# Patient Record
Sex: FEMALE | Race: WHITE | NOT HISPANIC OR LATINO | URBAN - METROPOLITAN AREA
[De-identification: names, ages, dates, MRNs, and addresses within clinical notes are randomized per-mention and may not be internally consistent; named-entity substitution may affect disease eponyms.]

---

## 2022-10-17 ENCOUNTER — OFFICE VISIT (OUTPATIENT)
Dept: ENDOCRINOLOGY | Facility: CLINIC | Age: 52
End: 2022-10-17

## 2022-10-17 ENCOUNTER — TELEPHONE (OUTPATIENT)
Dept: ENDOCRINOLOGY | Facility: CLINIC | Age: 52
End: 2022-10-17

## 2022-10-17 ENCOUNTER — LAB (OUTPATIENT)
Dept: LAB | Facility: HOSPITAL | Age: 52
End: 2022-10-17

## 2022-10-17 VITALS
WEIGHT: 144 LBS | BODY MASS INDEX: 24.59 KG/M2 | SYSTOLIC BLOOD PRESSURE: 100 MMHG | HEIGHT: 64 IN | DIASTOLIC BLOOD PRESSURE: 66 MMHG | OXYGEN SATURATION: 98 % | HEART RATE: 71 BPM

## 2022-10-17 DIAGNOSIS — E03.8 HYPOTHYROIDISM DUE TO HASHIMOTO'S THYROIDITIS: Primary | ICD-10-CM

## 2022-10-17 DIAGNOSIS — E06.3 HYPOTHYROIDISM DUE TO HASHIMOTO'S THYROIDITIS: Primary | ICD-10-CM

## 2022-10-17 DIAGNOSIS — E28.2 PCOS (POLYCYSTIC OVARIAN SYNDROME): ICD-10-CM

## 2022-10-17 DIAGNOSIS — R53.1 WEAKNESS: ICD-10-CM

## 2022-10-17 LAB
ALBUMIN SERPL-MCNC: 4.2 G/DL (ref 3.5–5.2)
ALBUMIN/GLOB SERPL: 1.8 G/DL
ALP SERPL-CCNC: 70 U/L (ref 39–117)
ALT SERPL W P-5'-P-CCNC: 15 U/L (ref 1–33)
ANION GAP SERPL CALCULATED.3IONS-SCNC: 12 MMOL/L (ref 5–15)
AST SERPL-CCNC: 17 U/L (ref 1–32)
BILIRUB SERPL-MCNC: 0.9 MG/DL (ref 0–1.2)
BUN SERPL-MCNC: 12 MG/DL (ref 6–20)
BUN/CREAT SERPL: 15 (ref 7–25)
CALCIUM SPEC-SCNC: 9.9 MG/DL (ref 8.6–10.5)
CHLORIDE SERPL-SCNC: 104 MMOL/L (ref 98–107)
CO2 SERPL-SCNC: 25 MMOL/L (ref 22–29)
CREAT SERPL-MCNC: 0.8 MG/DL (ref 0.57–1)
DEPRECATED RDW RBC AUTO: 41.2 FL (ref 37–54)
EGFRCR SERPLBLD CKD-EPI 2021: 88.8 ML/MIN/1.73
ERYTHROCYTE [DISTWIDTH] IN BLOOD BY AUTOMATED COUNT: 12.4 % (ref 12.3–15.4)
GLOBULIN UR ELPH-MCNC: 2.4 GM/DL
GLUCOSE SERPL-MCNC: 85 MG/DL (ref 65–99)
HBA1C MFR BLD: 5.2 % (ref 4.8–5.6)
HCT VFR BLD AUTO: 39.6 % (ref 34–46.6)
HGB BLD-MCNC: 13.5 G/DL (ref 12–15.9)
MAGNESIUM SERPL-MCNC: 2 MG/DL (ref 1.6–2.6)
MCH RBC QN AUTO: 30.5 PG (ref 26.6–33)
MCHC RBC AUTO-ENTMCNC: 34.1 G/DL (ref 31.5–35.7)
MCV RBC AUTO: 89.4 FL (ref 79–97)
PLATELET # BLD AUTO: 282 10*3/MM3 (ref 140–450)
PMV BLD AUTO: 10.4 FL (ref 6–12)
POTASSIUM SERPL-SCNC: 4.5 MMOL/L (ref 3.5–5.2)
PROT SERPL-MCNC: 6.6 G/DL (ref 6–8.5)
RBC # BLD AUTO: 4.43 10*6/MM3 (ref 3.77–5.28)
SODIUM SERPL-SCNC: 141 MMOL/L (ref 136–145)
T4 FREE SERPL-MCNC: 1.61 NG/DL (ref 0.93–1.7)
TSH SERPL DL<=0.05 MIU/L-ACNC: 1 UIU/ML (ref 0.27–4.2)
WBC NRBC COR # BLD: 5.67 10*3/MM3 (ref 3.4–10.8)

## 2022-10-17 PROCEDURE — 99204 OFFICE O/P NEW MOD 45 MIN: CPT | Performed by: INTERNAL MEDICINE

## 2022-10-17 PROCEDURE — 85027 COMPLETE CBC AUTOMATED: CPT | Performed by: INTERNAL MEDICINE

## 2022-10-17 PROCEDURE — 84439 ASSAY OF FREE THYROXINE: CPT | Performed by: INTERNAL MEDICINE

## 2022-10-17 PROCEDURE — 80053 COMPREHEN METABOLIC PANEL: CPT | Performed by: INTERNAL MEDICINE

## 2022-10-17 PROCEDURE — 83036 HEMOGLOBIN GLYCOSYLATED A1C: CPT | Performed by: INTERNAL MEDICINE

## 2022-10-17 PROCEDURE — 83735 ASSAY OF MAGNESIUM: CPT | Performed by: INTERNAL MEDICINE

## 2022-10-17 PROCEDURE — 84443 ASSAY THYROID STIM HORMONE: CPT | Performed by: INTERNAL MEDICINE

## 2022-10-17 RX ORDER — CHLORAL HYDRATE 500 MG
2 CAPSULE ORAL DAILY
COMMUNITY

## 2022-10-17 RX ORDER — LEVOTHYROXINE SODIUM 88 MCG
88 TABLET ORAL DAILY
Qty: 30 TABLET | Refills: 5 | Status: SHIPPED | OUTPATIENT
Start: 2022-10-17

## 2022-10-17 RX ORDER — MULTIVIT-MIN/IRON/FOLIC ACID/K 18-600-40
CAPSULE ORAL
COMMUNITY

## 2022-10-17 RX ORDER — LIOTHYRONINE SODIUM 5 UG/1
5 TABLET ORAL DAILY
Qty: 30 TABLET | Refills: 5 | Status: SHIPPED | OUTPATIENT
Start: 2022-10-17 | End: 2022-10-20 | Stop reason: ALTCHOICE

## 2022-10-17 RX ORDER — METFORMIN HYDROCHLORIDE 500 MG/1
2 TABLET, EXTENDED RELEASE ORAL 2 TIMES DAILY
COMMUNITY
Start: 2022-10-07

## 2022-10-17 RX ORDER — FERROUS SULFATE 325(65) MG
325 TABLET ORAL DAILY
COMMUNITY

## 2022-10-17 RX ORDER — LEVOTHYROXINE SODIUM 100 MCG
TABLET ORAL
COMMUNITY
Start: 2022-10-15 | End: 2022-10-17

## 2022-10-17 NOTE — PROGRESS NOTES
Chief Complaint   Patient presents with   • Hashimoto's Thyroiditis        Referring Provider  No ref. provider found     HPI   Janeth Lemus is a 51 y.o. female had concerns including Hashimoto's Thyroiditis.     Hypothyroidism diagnosed around .   Has been following with GP in recent years. I saw her ~4 years ago at [facility].     Historically has felt best with TSH around 2 or under.     Has had trouble with weight recently. Is active. Runs several days per week.    Last TSH she thinks was under two but feels like it may be up now.  We have no records for review.  In the past was on levothyroxine and levels weren't controlled or stable. Prefers brand Synthroid.    Take Synthroid 100 mcg daily in the mornings, 30 minutes prior to meals/meds. No missed doses.    Isn't exercising as much. Weight is trending up. Feeling slightly depressed.     Is on metformin for history of PCOS.   Has been taken off the medication and has severe constipation in the past.     Has extreme muscle stiffness until she has stretched in the mornings. Worries about Rheumatologic disorders. Hasn't seen rheumatology.    Had a thyroid ultrasound years ago. Recalls she had a thyroid FNA that was benign.     In the last year her mother passed from Alzheimer's and her sister was recently Parkinsons.     Past Medical History:   Diagnosis Date   • Hashimoto's thyroiditis    • PCOS (polycystic ovarian syndrome)      Past Surgical History:   Procedure Laterality Date   • BILATERAL BREAST REDUCTION     •  SECTION        Family History   Problem Relation Age of Onset   • Alzheimer's disease Mother    • Parkinsonism Sister       Social History     Socioeconomic History   • Marital status: Single   Tobacco Use   • Smoking status: Never   • Smokeless tobacco: Never   Vaping Use   • Vaping Use: Never used   Substance and Sexual Activity   • Alcohol use: Yes     Comment: social   • Drug use: Never      Allergies   Allergen  "Reactions   • Sulfa Antibiotics Swelling      Current Outpatient Medications on File Prior to Visit   Medication Sig Dispense Refill   • metFORMIN ER (GLUCOPHAGE-XR) 500 MG 24 hr tablet Take 2 tablets by mouth 2 (Two) Times a Day.     • Synthroid 100 MCG tablet        No current facility-administered medications on file prior to visit.        Review of Systems   Constitutional: Positive for fatigue and unexpected weight gain.   Musculoskeletal: Positive for arthralgias and myalgias.        See HPI   Neurological: Positive for weakness.        /66   Pulse 71   Ht 162.6 cm (64\")   Wt 65.3 kg (144 lb)   SpO2 98%   BMI 24.72 kg/m²      Physical Exam  Vitals reviewed.   Constitutional:       Appearance: Normal appearance.   Neck:      Thyroid: Thyromegaly (mild, no palpable nodules) present.   Cardiovascular:      Rate and Rhythm: Normal rate.   Pulmonary:      Effort: Pulmonary effort is normal.   Neurological:      General: No focal deficit present.      Mental Status: She is alert. Mental status is at baseline.   Psychiatric:         Mood and Affect: Mood normal.         Behavior: Behavior normal.         Labs/Imaging    None available    Assessment and Plan    Diagnoses and all orders for this visit:    1. Hypothyroidism due to Hashimoto's thyroiditis (Primary)  Clinically thyroid on Synthroid 100 mcg daily though complaining of fatigue, difficulty with weight gain.  Suspect her TSH may not be at goal.  Historically has felt better with a level of 2 or under.  Prefers brand Synthroid.  Levels were not stable on levothyroxine.  Check TFTs today and titrate Synthroid if needed for TSH in the low range of normal.  If TSH is in an optimal range, discussed addition of T3 with relative reduction in Synthroid with recheck labs in 6 weeks.  Patient has a reported history of thyroid nodule with benign FNA.  We are requesting records from her last ultrasound.  She has a mild goiter on exam, no palpable nodules.  Can " check ultrasound at follow-up visit.  -     T4, Free  -     TSH  -     T4, Free; Future  -     TSH; Future    2. Weakness  Unclear etiology.  Check metabolic panel and electrolytes.  -     CBC (No Diff)  -     Comprehensive Metabolic Panel  -     Magnesium    3. PCOS (polycystic ovarian syndrome)  Check A1c.  Feels better on metformin.  Continue metformin.  -     Hemoglobin A1c       **Addendum 10/17/2022:    Thyroid ultrasound report reviewed from 3/5/2021.  Right thyroid measured 5.2 x 2.0 x 1.7 cm, left 5.0 x 2.4 x 2.1 cm, thyroid gland diffusely heterogeneous, 3 mm echogenic nodule in the right thyroid, prominent vascularity, previously described left thyroid nodules not clearly seen.    Confirmed with patient that no additional ultrasound monitoring is needed.    Return in about 6 months (around 4/17/2023) for next scheduled follow up. The patient was instructed to contact the clinic with any interval questions or concerns.    Lexi Milner, DO   Endocrinologist    Please note that portions of this note were completed with a voice recognition program.

## 2022-10-17 NOTE — TELEPHONE ENCOUNTER
----- Message from Lexi Milner DO sent at 10/17/2022  9:15 AM EDT -----  Regarding: labs/records  Please request labs from PCPs office within the last year.  Please request thyroid ultrasound, FNA reports and my prior notes for them seeing the patient at [facility] 4+ years ago.  Thank you!

## 2022-10-20 ENCOUNTER — TELEPHONE (OUTPATIENT)
Dept: ENDOCRINOLOGY | Facility: CLINIC | Age: 52
End: 2022-10-20

## 2022-10-20 DIAGNOSIS — E03.8 HYPOTHYROIDISM DUE TO HASHIMOTO'S THYROIDITIS: ICD-10-CM

## 2022-10-20 DIAGNOSIS — E06.3 HYPOTHYROIDISM DUE TO HASHIMOTO'S THYROIDITIS: ICD-10-CM

## 2022-10-20 RX ORDER — LIOTHYRONINE SODIUM 5 MCG
5 TABLET ORAL DAILY
Qty: 30 TABLET | Refills: 5 | Status: SHIPPED | OUTPATIENT
Start: 2022-10-20 | End: 2023-01-05 | Stop reason: SDUPTHER

## 2022-10-20 NOTE — TELEPHONE ENCOUNTER
----- Message from Janeth Lemus sent at 10/20/2022  6:53 AM EDT -----  Regarding: Cytomel  Contact: [phone]  I received a prescriptions for namebrand Synthroid and generic Cytomel.. My insurance will pay for namebrand Cytomel If it is sent in that way like the synthroid, can it be corrected? I do better on the name brand thyroid medication’s. Thank you.

## 2023-01-05 RX ORDER — LIOTHYRONINE SODIUM 5 MCG
5 TABLET ORAL DAILY
Qty: 30 TABLET | Refills: 5 | Status: SHIPPED | OUTPATIENT
Start: 2023-01-05

## 2023-01-05 NOTE — TELEPHONE ENCOUNTER
Patient is calling requesting to speak to nurse in regards to an update on her insurance receiving the form for prescription.    Please advise.

## 2023-01-05 NOTE — TELEPHONE ENCOUNTER
Spoke with pharmacy.  They are needing a prescription sent for brand Cytomel.  Rx pended.  They state they never received previous prescription.

## 2023-01-06 ENCOUNTER — PRIOR AUTHORIZATION (OUTPATIENT)
Dept: ENDOCRINOLOGY | Facility: CLINIC | Age: 53
End: 2023-01-06
Payer: COMMERCIAL

## 2023-01-06 NOTE — TELEPHONE ENCOUNTER
Janeth Mariah Key: [id number] - [place] Case ID: [id number] help? Call us at [phone]  Outcome  Approvedtoday  The request has been approved. The authorization is effective for a maximum of 12 fills from 01/05/2023 to 01/04/2024, as long as the member is enrolled in their current health plan. The request was reviewed and approved by a licensed clinical pharmacist. A written notification letter will follow with additional details.  Drug  Cytomel 5MCG tablets  Form  Med[employer]act [place] Medicaid ePA Form 2017 NCPDP

## 2023-04-17 ENCOUNTER — OFFICE VISIT (OUTPATIENT)
Dept: ENDOCRINOLOGY | Facility: CLINIC | Age: 53
End: 2023-04-17
Payer: COMMERCIAL

## 2023-04-17 VITALS
SYSTOLIC BLOOD PRESSURE: 112 MMHG | OXYGEN SATURATION: 97 % | HEART RATE: 77 BPM | HEIGHT: 64 IN | WEIGHT: 140 LBS | BODY MASS INDEX: 23.9 KG/M2 | DIASTOLIC BLOOD PRESSURE: 69 MMHG

## 2023-04-17 DIAGNOSIS — E06.3 HYPOTHYROIDISM DUE TO HASHIMOTO'S THYROIDITIS: Primary | ICD-10-CM

## 2023-04-17 DIAGNOSIS — E03.8 HYPOTHYROIDISM DUE TO HASHIMOTO'S THYROIDITIS: Primary | ICD-10-CM

## 2023-04-17 DIAGNOSIS — E28.2 PCOS (POLYCYSTIC OVARIAN SYNDROME): ICD-10-CM

## 2023-04-17 PROCEDURE — 1160F RVW MEDS BY RX/DR IN RCRD: CPT | Performed by: INTERNAL MEDICINE

## 2023-04-17 PROCEDURE — 1159F MED LIST DOCD IN RCRD: CPT | Performed by: INTERNAL MEDICINE

## 2023-04-17 PROCEDURE — 99214 OFFICE O/P EST MOD 30 MIN: CPT | Performed by: INTERNAL MEDICINE

## 2023-04-17 RX ORDER — FERROUS SULFATE 325(65) MG
325 TABLET ORAL
COMMUNITY
End: 2023-04-17 | Stop reason: SDUPTHER

## 2023-04-17 RX ORDER — MELATONIN
1000 DAILY
COMMUNITY

## 2023-04-17 RX ORDER — LEVOTHYROXINE SODIUM 100 MCG
100 TABLET ORAL DAILY
Qty: 90 TABLET | Refills: 3 | Status: SHIPPED | OUTPATIENT
Start: 2023-04-17 | End: 2024-04-16

## 2023-04-17 NOTE — PROGRESS NOTES
"Chief Complaint   Patient presents with   • Hypothyroidism        HPI   Janeth Lemus is a 52 y.o. female had concerns including Hypothyroidism.      Here for thyroid follow-up.  Was changed from Synthroid 100 mcg daily to Synthroid 88 mcg with Cytomel 5 mcg daily at her last visit.  Most recent T4 in a normal range, TSH was slightly suppressed. She hasn't noted much change in symptoms other than awakening overnight some, morning fatigue.     She takes metformin for history of PCOS. Has been told to stay on the metformin and feels well on it. Has considered decreasing the dose. Last A1c 5.2 in October. Was told she had insulin resistance in the past.     Has changed her diet - whole foods, vegan. She feels great since this change about a week ago. Is active - runs.    The following portions of the patient's history were reviewed and updated as appropriate: allergies, current medications, past family history, past medical history, past social history, past surgical history and problem list.    Review of Systems   Constitutional: Positive for fatigue.   Endocrine: Negative.    Psychiatric/Behavioral: Positive for sleep disturbance.        /69   Pulse 77   Ht 162.6 cm (64\")   Wt 63.5 kg (140 lb)   SpO2 97%   BMI 24.03 kg/m²      Physical Exam  Vitals reviewed.   Constitutional:       Appearance: Normal appearance.   Cardiovascular:      Rate and Rhythm: Normal rate.   Pulmonary:      Effort: Pulmonary effort is normal.   Neurological:      General: No focal deficit present.      Mental Status: She is alert. Mental status is at baseline.   Psychiatric:         Mood and Affect: Mood normal.         Behavior: Behavior normal.              LABS AND IMAGING   CMP  Lab Results   Component Value Date    GLUCOSE 85 10/17/2022    BUN 12 10/17/2022    CREATININE 0.80 10/17/2022    BCR 15.0 10/17/2022    K 4.5 10/17/2022    CO2 25.0 10/17/2022    CALCIUM 9.9 10/17/2022    ALBUMIN 4.20 10/17/2022    AST 17 10/17/2022    " ALT 15 10/17/2022      CBC w/DIFF   Lab Results   Component Value Date    WBC 5.67 10/17/2022    RBC 4.43 10/17/2022    HGB 13.5 10/17/2022    HCT 39.6 10/17/2022    MCV 89.4 10/17/2022    MCH 30.5 10/17/2022    MCHC 34.1 10/17/2022    RDW 12.4 10/17/2022    RDWSD 41.2 10/17/2022    MPV 10.4 10/17/2022     10/17/2022     TSH  Lab Results   Component Value Date    TSH 1.000 10/17/2022     T4  Lab Results   Component Value Date    FREET4 1.61 10/17/2022     4/4/2023 TSH 0.32, free T4 1.16    Thyroid ultrasound report reviewed from 3/5/2021.  Right thyroid measured 5.2 x 2.0 x 1.7 cm, left 5.0 x 2.4 x 2.1 cm, thyroid gland diffusely heterogeneous, 3 mm echogenic nodule in the right thyroid, prominent vascularity, previously described left thyroid nodules not clearly seen.      Assessment and Plan    Diagnoses and all orders for this visit:    1. Hypothyroidism due to Hashimoto's thyroiditis (Primary)  Clinically euthyroid on Synthroid 88 mcg daily with Cytomel 5 mcg daily.  Recent TSH slightly suppressed.  Patient notes no improved symptoms on the new regimen (history of this on Synthroid 100 mcg daily) and TSH now suppressed.  Discussed risk versus benefits.  For now we will change back to prior regimen and recheck labs in 2 months.  -     Synthroid 100 MCG tablet; Take 1 tablet by mouth Daily.  Dispense: 90 tablet; Refill: 3  -     T4, Free; Future  -     TSH; Future    2. PCOS (polycystic ovarian syndrome)  History of PCOS for which she takes metformin 1000 mg twice daily.  Last A1c 5.2.  Was told years ago she has insulin resistance.  Is trying to minimize medications unless necessary, but historically has felt best on metformin.  Decrease dose to 500 mg twice daily and recheck TFTs in 2 months.  Will check A1c, lipid, and metabolic panel.  -     Comprehensive Metabolic Panel; Future  -     Hemoglobin A1c; Future  -     Lipid Panel; Future         Return in about 6 months (around 10/17/2023) for next  scheduled follow up. The patient was instructed to contact the clinic with any interval questions or concerns.    Lexi Milner, DO   Endocrinologist    Please note that portions of this note were completed with a voice recognition program.

## 2023-06-05 ENCOUNTER — TELEPHONE (OUTPATIENT)
Dept: ENDOCRINOLOGY | Facility: CLINIC | Age: 53
End: 2023-06-05
Payer: COMMERCIAL

## 2023-06-05 NOTE — TELEPHONE ENCOUNTER
CALL BACK [phone]    PATIENT IS REQUESTING TO HAVE THYROID LAB ORDERS SENT TO [facility].

## 2023-08-17 ENCOUNTER — TELEPHONE (OUTPATIENT)
Dept: ENDOCRINOLOGY | Facility: CLINIC | Age: 53
End: 2023-08-17

## 2023-08-17 DIAGNOSIS — E03.8 HYPOTHYROIDISM DUE TO HASHIMOTO'S THYROIDITIS: Primary | ICD-10-CM

## 2023-08-17 DIAGNOSIS — E06.3 HYPOTHYROIDISM DUE TO HASHIMOTO'S THYROIDITIS: Primary | ICD-10-CM

## 2023-08-17 NOTE — TELEPHONE ENCOUNTER
PATIENT WOULD LIKE LAB ORDERS PLACED IN CHART. SHE WOULD LIKE TO HAVE HER THYROID LEVELS CHECKED. SHE WANTS US TO MAIL HER THE ORDERS FOR LAB WORK. PHONE NUMBER [phone]

## 2023-11-08 ENCOUNTER — TELEPHONE (OUTPATIENT)
Dept: ENDOCRINOLOGY | Facility: CLINIC | Age: 53
End: 2023-11-08

## 2023-11-08 NOTE — TELEPHONE ENCOUNTER
Caller: Janeth Lemus    Relationship to patient: Self    Best call back number: [phone]     Patient is needing: WOULD LIKE HER LAB ORDERS SENT OVER [phone]

## 2023-11-29 DIAGNOSIS — E06.3 HYPOTHYROIDISM DUE TO HASHIMOTO'S THYROIDITIS: ICD-10-CM

## 2023-11-29 DIAGNOSIS — E03.8 HYPOTHYROIDISM DUE TO HASHIMOTO'S THYROIDITIS: ICD-10-CM

## 2023-11-29 RX ORDER — LEVOTHYROXINE SODIUM 88 MCG
88 TABLET ORAL DAILY
Qty: 30 TABLET | Refills: 3 | Status: SHIPPED | OUTPATIENT
Start: 2023-11-29 | End: 2024-11-28

## 2024-01-29 ENCOUNTER — TELEPHONE (OUTPATIENT)
Dept: ENDOCRINOLOGY | Facility: CLINIC | Age: 54
End: 2024-01-29

## 2024-01-29 DIAGNOSIS — E03.8 HYPOTHYROIDISM DUE TO HASHIMOTO'S THYROIDITIS: Primary | ICD-10-CM

## 2024-01-29 DIAGNOSIS — E06.3 HYPOTHYROIDISM DUE TO HASHIMOTO'S THYROIDITIS: Primary | ICD-10-CM

## 2024-01-29 NOTE — TELEPHONE ENCOUNTER
Caller: Janeth Lemus    Relationship: Self    Best call back number: [phone] (home) [phone] (work)      What orders are you requesting (i.e. lab or imaging): LABS    In what timeframe would the patient need to come in: PT WOULD LIKE ORDERS MAILED TO HER SO SHE CAN HAVE THEM DONE CLOSE TO HOME    Where will you receive your lab/imaging services:     Additional notes: t.”

## 2024-02-27 DIAGNOSIS — E03.8 HYPOTHYROIDISM DUE TO HASHIMOTO'S THYROIDITIS: ICD-10-CM

## 2024-02-27 DIAGNOSIS — E06.3 HYPOTHYROIDISM DUE TO HASHIMOTO'S THYROIDITIS: ICD-10-CM

## 2024-02-27 RX ORDER — LEVOTHYROXINE SODIUM 88 MCG
88 TABLET ORAL DAILY
Qty: 30 TABLET | Refills: 3 | Status: SHIPPED | OUTPATIENT
Start: 2024-02-27 | End: 2025-02-26

## 2024-02-27 NOTE — TELEPHONE ENCOUNTER
Rx Refill Note  Requested Prescriptions     Pending Prescriptions Disp Refills    Synthroid 88 MCG tablet 30 tablet 3     Sig: Take 1 tablet by mouth Daily.      Last office visit with prescribing clinician: 4/17/2023     Next office visit with prescribing clinician: 7/8/2024       Trinity Rey MA  02/27/24, 12:00 EST

## 2024-08-04 DIAGNOSIS — E03.8 HYPOTHYROIDISM DUE TO HASHIMOTO'S THYROIDITIS: ICD-10-CM

## 2024-08-04 DIAGNOSIS — E06.3 HYPOTHYROIDISM DUE TO HASHIMOTO'S THYROIDITIS: ICD-10-CM

## 2024-08-05 RX ORDER — LEVOTHYROXINE SODIUM 88 MCG
88 TABLET ORAL DAILY
Qty: 30 TABLET | Refills: 3 | OUTPATIENT
Start: 2024-08-05

## 2024-08-12 ENCOUNTER — OFFICE VISIT (OUTPATIENT)
Dept: ENDOCRINOLOGY | Facility: CLINIC | Age: 54
End: 2024-08-12
Payer: MEDICAID

## 2024-08-12 VITALS
HEART RATE: 92 BPM | DIASTOLIC BLOOD PRESSURE: 68 MMHG | BODY MASS INDEX: 22.88 KG/M2 | WEIGHT: 134 LBS | RESPIRATION RATE: 18 BRPM | HEIGHT: 64 IN | OXYGEN SATURATION: 96 % | SYSTOLIC BLOOD PRESSURE: 116 MMHG

## 2024-08-12 DIAGNOSIS — G47.00 INSOMNIA, UNSPECIFIED TYPE: ICD-10-CM

## 2024-08-12 DIAGNOSIS — E06.3 HYPOTHYROIDISM DUE TO HASHIMOTO'S THYROIDITIS: Primary | ICD-10-CM

## 2024-08-12 DIAGNOSIS — E03.8 HYPOTHYROIDISM DUE TO HASHIMOTO'S THYROIDITIS: Primary | ICD-10-CM

## 2024-08-12 PROCEDURE — 99213 OFFICE O/P EST LOW 20 MIN: CPT | Performed by: INTERNAL MEDICINE

## 2024-08-12 PROCEDURE — 1159F MED LIST DOCD IN RCRD: CPT | Performed by: INTERNAL MEDICINE

## 2024-08-12 PROCEDURE — 1160F RVW MEDS BY RX/DR IN RCRD: CPT | Performed by: INTERNAL MEDICINE

## 2024-08-12 RX ORDER — LEVOTHYROXINE SODIUM 88 MCG
88 TABLET ORAL DAILY
Qty: 30 TABLET | Refills: 11 | Status: SHIPPED | OUTPATIENT
Start: 2024-08-12 | End: 2025-08-12

## 2024-08-12 RX ORDER — AMITRIPTYLINE HYDROCHLORIDE 10 MG/1
10 TABLET, FILM COATED ORAL NIGHTLY
COMMUNITY
Start: 2024-08-07

## 2024-08-12 NOTE — PROGRESS NOTES
"Chief Complaint   Patient presents with    Hypothyroidism     Follow up         HPI   Janeth Lemus is a 53 y.o. female had concerns including Hypothyroidism (Follow up ).      Is on Synthroid 88 mcg daily. Feeling good.     Has lost 6 lbs in the last year and half plus.  Following a diet and exercising routinely.    Has insomnia.  Amitriptyline helps.  She is out of this prescription currently.  Has not tried OTC melatonin or Unisom.  Goes to bed around 11 PM, sometimes on electronic devices in the evening.  Wakes around 6:30-7.    The following portions of the patient's history were reviewed and updated as appropriate: allergies, current medications, past family history, past medical history, past social history, past surgical history, and problem list.    Review of Systems   Constitutional:  Positive for fatigue.   Endocrine: Negative.    Psychiatric/Behavioral:  Positive for sleep disturbance.         /68 (BP Location: Right arm, Patient Position: Sitting, Cuff Size: Adult)   Pulse 92   Resp 18   Ht 162.6 cm (64\")   Wt 60.8 kg (134 lb)   SpO2 96%   BMI 23.00 kg/m²      Physical Exam  Vitals reviewed.   Constitutional:       Appearance: Normal appearance.   Neck:      Thyroid: No thyroid mass or thyromegaly.   Cardiovascular:      Rate and Rhythm: Normal rate.   Pulmonary:      Effort: Pulmonary effort is normal.   Neurological:      General: No focal deficit present.      Mental Status: She is alert. Mental status is at baseline.   Psychiatric:         Mood and Affect: Mood normal.         Behavior: Behavior normal.              LABS AND IMAGING   CMP  Lab Results   Component Value Date    GLUCOSE 85 10/17/2022    BUN 12 10/17/2022    CREATININE 0.80 10/17/2022    BCR 15.0 10/17/2022    K 4.5 10/17/2022    CO2 25.0 10/17/2022    CALCIUM 9.9 10/17/2022    ALBUMIN 4.20 10/17/2022    AST 17 10/17/2022    ALT 15 10/17/2022      CBC w/DIFF   Lab Results   Component Value Date    WBC 5.67 10/17/2022    RBC " 4.43 10/17/2022    HGB 13.5 10/17/2022    HCT 39.6 10/17/2022    MCV 89.4 10/17/2022    MCH 30.5 10/17/2022    MCHC 34.1 10/17/2022    RDW 12.4 10/17/2022    RDWSD 41.2 10/17/2022    MPV 10.4 10/17/2022     10/17/2022     TSH  Lab Results   Component Value Date    TSH 1.000 10/17/2022     T4  Lab Results   Component Value Date    FREET4 1.61 10/17/2022     4/4/2023 TSH 0.32, free T4 1.16     6/17/23 CMP with creatinine 0.6, GFR greater than 60, LFTs normal, total cholesterol 179, triglycerides 123, HDL 59, LDL 95, TSH 1.03, free T4 1.59, A1c 5.3    11/15/2023 CMP within normal limits, TSH 0.17, free T4 1.9, total cholesterol 190, triglycerides 117, HDL 63, , A1c 5.1    8/5/2024 TSH 0.42 (lower limit 0.46), free T4 1.51 (upper limit 2.19)    Thyroid ultrasound report reviewed from 3/5/2021.  Right thyroid measured 5.2 x 2.0 x 1.7 cm, left 5.0 x 2.4 x 2.1 cm, thyroid gland diffusely heterogeneous, 3 mm echogenic nodule in the right thyroid, prominent vascularity, previously described left thyroid nodules not clearly seen.        Assessment and Plan    Diagnoses and all orders for this visit:    1. Hypothyroidism due to Hashimoto's thyroiditis (Primary)  Clinically euthyroid on Synthroid 88 mcg daily.  Recent TFTs are stable.  TSH minimally suppressed but T4 is in an optimal range.  No change in dose is needed at this time.  Refill was sent to the pharmacy.  Consider transition to Synthroid delivers mail order pharmacy if out-of-pocket cost increases.  Labs again in 3 months to ensure stability. Then q 6-12 months if stable.   Last ultrasound was 2021.  She had a heterogeneous gland with a 3 mm nodule.  The thyroid is normal on exam today.  No palpable nodules.  Repeat ultrasound is not indicated.  -     T4, Free; Future  -     TSH; Future  -     Synthroid 88 MCG tablet; Take 1 tablet by mouth Daily.  Dispense: 30 tablet; Refill: 11    2. Insomnia  Follow-up with PCP for prescription of amitriptyline.   Consider OTC melatonin and/or Unisom in the meantime.  Recommended she increase overnight sleep hours, go to bed earlier, minimize screen time late in the evenings.      Return in about 1 year (around 8/12/2025) for next scheduled follow up. The patient was instructed to contact the clinic with any interval questions or concerns.    Electronically signed by: Lexi Milner DO   Endocrinologist    Please note that portions of this note were completed with a voice recognition program.

## 2024-08-14 ENCOUNTER — PRIOR AUTHORIZATION (OUTPATIENT)
Dept: ENDOCRINOLOGY | Facility: CLINIC | Age: 54
End: 2024-08-14
Payer: MEDICAID

## 2024-08-14 NOTE — TELEPHONE ENCOUNTER
Janeth Lemus (Key: [id number])  PA Case ID #: [id number]  Rx #: [id number]  Need Help? Call us at [phone]  Outcome  Approved today by [place] Medicaid MedImpact 2017  The request has been approved. The authorization is effective from 08/14/2024 to 08/14/2025, as long as the member is enrolled in their current health plan. A written notification letter will follow with additional details.  Authorization Expiration Date: 8/13/2025  Drug  Synthroid 88MCG tablets  ePA cloud logo  Form  Med[employer]act [place] Medicaid ePA Form 2017 NCPDP

## 2024-08-27 ENCOUNTER — TELEPHONE (OUTPATIENT)
Dept: ENDOCRINOLOGY | Facility: CLINIC | Age: 54
End: 2024-08-27

## 2024-09-30 ENCOUNTER — TELEPHONE (OUTPATIENT)
Dept: ENDOCRINOLOGY | Facility: CLINIC | Age: 54
End: 2024-09-30

## 2024-09-30 NOTE — TELEPHONE ENCOUNTER
Caller: Janeth Lemus    Relationship: Self    Best call back number: [phone]     Requested Prescriptions:   amitriptyline (ELAVIL) 10 MG tablet [432] (Order [id number])     Pharmacy where request should be sent:    [facility]/pharmacy #[id number] - [address] - [phone]  - [phone] FX     Last office visit with prescribing clinician: 8/12/2024   Next office visit with prescribing clinician: 8/11/2025     Additional details provided by patient:     TODAY IS THE LAST DAY THE PATIENT HAS INSURANCE COVERAGE. SHE WOULD LIKE IT SENT AS SOON AS POSSIBLE.     PATIENT'S PHARMACY STATES THEY DID NOT RECEIVE IT BEFORE AND WILL NEED IT SENT AGAIN.     Does the patient have less than a 3 day supply:  [x] Yes  [] No

## 2024-09-30 NOTE — TELEPHONE ENCOUNTER
PT CALLED AGAIN TO F/U ON THIS BEING SENT IN. SHE IS OUT OF THE MED AND HER INSURANCE ENDS TODAY.

## 2024-10-09 ENCOUNTER — TELEPHONE (OUTPATIENT)
Dept: ENDOCRINOLOGY | Facility: CLINIC | Age: 54
End: 2024-10-09
Payer: MEDICAID

## 2024-12-19 DIAGNOSIS — E06.3 HYPOTHYROIDISM DUE TO HASHIMOTO'S THYROIDITIS: Primary | ICD-10-CM

## 2024-12-19 RX ORDER — LEVOTHYROXINE SODIUM 75 MCG
75 TABLET ORAL DAILY
Qty: 30 TABLET | Refills: 5 | Status: SHIPPED | OUTPATIENT
Start: 2024-12-19 | End: 2025-12-19

## 2025-03-05 ENCOUNTER — TELEPHONE (OUTPATIENT)
Dept: ENDOCRINOLOGY | Facility: CLINIC | Age: 55
End: 2025-03-05
Payer: COMMERCIAL

## 2025-03-05 NOTE — TELEPHONE ENCOUNTER
Hub staff attempted to follow warm transfer process and was unsuccessful     Caller: Janeth Lemus    Relationship to patient: Self    Best call back number: [phone]    Patient is needing: ASKED THAT WE  REACH OUT TO DR. CHADWICK OFFICE TODAY AND LET THEM KNOW WE STILL HAVE NOT RECEIVED THE THYRIOD  AND TSH LAB RESULTS WE DID RECEIVE THE T3 LABS ON 2/24. DR. ROSALIE CHADWICK [id number], [place] [id number] PHONE  [phone]

## 2025-03-05 NOTE — TELEPHONE ENCOUNTER
Spoke with patient and she does not want to do a poLight video because she does not have it. She only wanted to discuss via telephone. I offered to resend her a link or call the poLight help desk but she refused. She wants Dr. Milner to make a recommendation only and call her on changes to her medications. Advised that Dr. Milner would prefer to discuss during a visit but she was adamant about asking Dr. Milner if she can just change her meds without a earlier visit.

## 2025-03-05 NOTE — TELEPHONE ENCOUNTER
SPOKE WITH PATIENT AND SHE IS SCHEDULED FOR A MYCHART VIDEO VISIT TOMORROW. SHE IS GOING TO GET LOGGED BACK IN TO HER MYCHART AND TRY TO GET THAT READY. SHE WILL CALL BACK WITH ANY QUESTIONS OR CONCERNS.

## 2025-03-05 NOTE — TELEPHONE ENCOUNTER
Visit needed. Office policy has changed and if making multiple dose adjustments between visits she needs to be seen.

## 2025-03-06 ENCOUNTER — TELEMEDICINE (OUTPATIENT)
Dept: ENDOCRINOLOGY | Facility: CLINIC | Age: 55
End: 2025-03-06
Payer: COMMERCIAL

## 2025-03-06 VITALS — HEIGHT: 64 IN | BODY MASS INDEX: 22.36 KG/M2 | WEIGHT: 131 LBS

## 2025-03-06 DIAGNOSIS — E06.3 HYPOTHYROIDISM DUE TO HASHIMOTO'S THYROIDITIS: Primary | ICD-10-CM

## 2025-03-06 PROCEDURE — 99214 OFFICE O/P EST MOD 30 MIN: CPT | Performed by: INTERNAL MEDICINE

## 2025-03-06 RX ORDER — LEVOTHYROXINE SODIUM 88 MCG
88 TABLET ORAL DAILY
Qty: 30 TABLET | Refills: 3 | Status: SHIPPED | OUTPATIENT
Start: 2025-03-06

## 2025-03-06 NOTE — PROGRESS NOTES
"Chief Complaint   Patient presents with    Hypothyroidism        HPI   Janeth Lemus is a 54 y.o. female had concerns including Hypothyroidism.      Visit was conducted via telemedicine. Consent was obtained from the patient to conduct a video visit.    Both patient and myself are in [place] at the time of the visit.    In the last few weeks she has noted fatigue, muscle aching. Similar to when levels were uncontrolled in the past.    GYN started her on HRT recently. Is on a cream.    Has lost a few lbs since her last visit. With clothes closer to 131. Notes more central weight gain when TSH is high.         The following portions of the patient's history were reviewed and updated as appropriate: allergies, current medications, past family history, past medical history, past social history, past surgical history, and problem list.    Review of Systems   Constitutional:  Positive for fatigue and unexpected weight gain.   Musculoskeletal:  Positive for myalgias.        Ht 162.6 cm (64\")   Wt 59.4 kg (131 lb)   BMI 22.49 kg/m²      Constitutional:  no acute distress   ENT/Thyroid: Not examined    Eyes: Not examined    Respiratory:  No conversational dyspnea    Cardiovascular:  Not performed.   Chest:  Not performed.   Abdomen: Not performed.   : Not performed.   Musculoskeletal: Not examined   Skin: not performed.   Neuro: mental status, speech normal, alert and oriented x3   Psych: oriented to time, place and person, mood and affect are within normal limits     LABS AND IMAGING  CMP  Lab Results   Component Value Date    GLUCOSE 85 10/17/2022    BUN 12 10/17/2022    CREATININE 0.80 10/17/2022    BCR 15.0 10/17/2022    K 4.5 10/17/2022    CO2 25.0 10/17/2022    CALCIUM 9.9 10/17/2022    ALBUMIN 4.20 10/17/2022    AST 17 10/17/2022    ALT 15 10/17/2022        CBC w/DIFF   Lab Results   Component Value Date    WBC 5.67 10/17/2022    RBC 4.43 10/17/2022    HGB 13.5 10/17/2022    HCT 39.6 10/17/2022    MCV 89.4 " 10/17/2022    MCH 30.5 10/17/2022    MCHC 34.1 10/17/2022    RDW 12.4 10/17/2022    RDWSD 41.2 10/17/2022    MPV 10.4 10/17/2022     10/17/2022     TSH  Lab Results   Component Value Date    TSH 1.000 10/17/2022     T4  Lab Results   Component Value Date    FREET4 1.61 10/17/2022     12/9/2024 TSH 0.09, free T4 1.80 on 88 synthroid, decrease to 75    2/24/2025 DHEA-S 167, normal, free T3 2.67 (Dr. Rivas), TSH 6.96, free T4 1.25 on 75 mcg daily, increased to 88 mcg     Assessment and Plan    Diagnoses and all orders for this visit:    1. Hypothyroidism due to Hashimoto's thyroiditis (Primary)  -     Synthroid 88 MCG tablet; Take 1 tablet by mouth Daily. On empty stomach  Dispense: 30 tablet; Refill: 3  -     T4, Free; Future  -     TSH; Future  On Synthroid 75 mcg daily.  Dose was reduced from 88 mcg after labs from 12/9/2024 showed TSH suppressed at 0.09, free T4 1.8.  TFTs checked 2/24/2025 with TSH now elevated at 6.96, free T4 1.25 on 75 mcg dose. She takes her Synthroid daily as directed with no missed doses.  Recently started on topical HRT with Dr. Rivas. This may change TFTs.  Change back to Synthroid 88 mcg daily and recheck labs in 6 to 8 weeks.  Lab orders will be mailed.  If the 88 mcg dose is too high again, consider alternating doses.  Prefers local pharmacy but if alternating doses she may consider transition to Synthroid delivers mail order pharmacy if out-of-pocket cost increases.  Last ultrasound was 2021.  She had a heterogeneous gland with a 3 mm nodule.  The thyroid has been normal on exam with no palpable nodules.  Repeat ultrasound is not indicated.       Return in about 5 months (around 8/6/2025) for next scheduled follow up. The patient was instructed to contact the clinic with any interval questions or concerns.    Electronically signed by: Lexi Milner DO   Endocrinologist    Please note that portions of this document were completed with a voice recognition program.

## 2025-07-03 DIAGNOSIS — E06.3 HYPOTHYROIDISM DUE TO HASHIMOTO'S THYROIDITIS: ICD-10-CM

## 2025-07-03 RX ORDER — LEVOTHYROXINE SODIUM 88 MCG
88 TABLET ORAL DAILY
Qty: 30 TABLET | Refills: 2 | Status: SHIPPED | OUTPATIENT
Start: 2025-07-03

## 2025-07-03 NOTE — TELEPHONE ENCOUNTER
Rx Refill Note  Requested Prescriptions     Pending Prescriptions Disp Refills    Synthroid 88 MCG tablet [Pharmacy Med Name: SYNTHROID 88 MCG TABLET] 30 tablet 0     Sig: TAKE 1 TABLET BY MOUTH DAILY ON AN EMPTY STOMACH      Last office visit with prescribing clinician: 8/12/2024     Next office visit with prescribing clinician: 7/3/2025     Quiana Sanchez MA  07/03/25, 15:51 EDT

## 2025-08-11 DIAGNOSIS — E06.3 HYPOTHYROIDISM DUE TO HASHIMOTO'S THYROIDITIS: ICD-10-CM

## 2025-08-12 RX ORDER — LEVOTHYROXINE SODIUM 88 MCG
88 TABLET ORAL DAILY
Qty: 30 TABLET | Refills: 0 | Status: SHIPPED | OUTPATIENT
Start: 2025-08-12

## 2025-08-25 ENCOUNTER — OFFICE VISIT (OUTPATIENT)
Dept: ENDOCRINOLOGY | Facility: CLINIC | Age: 55
End: 2025-08-25
Payer: COMMERCIAL

## 2025-08-25 VITALS
DIASTOLIC BLOOD PRESSURE: 84 MMHG | OXYGEN SATURATION: 100 % | BODY MASS INDEX: 21.58 KG/M2 | HEART RATE: 72 BPM | WEIGHT: 126.4 LBS | HEIGHT: 64 IN | SYSTOLIC BLOOD PRESSURE: 116 MMHG

## 2025-08-25 DIAGNOSIS — E06.3 HYPOTHYROIDISM DUE TO HASHIMOTO'S THYROIDITIS: Primary | ICD-10-CM

## 2025-08-25 DIAGNOSIS — Z78.0 POSTMENOPAUSAL: ICD-10-CM

## 2025-08-25 DIAGNOSIS — R53.82 CHRONIC FATIGUE: ICD-10-CM

## 2025-08-25 DIAGNOSIS — E55.9 VITAMIN D DEFICIENCY: ICD-10-CM

## 2025-08-25 PROCEDURE — 82670 ASSAY OF TOTAL ESTRADIOL: CPT | Performed by: INTERNAL MEDICINE

## 2025-08-25 PROCEDURE — 82306 VITAMIN D 25 HYDROXY: CPT | Performed by: INTERNAL MEDICINE

## 2025-08-25 PROCEDURE — 82728 ASSAY OF FERRITIN: CPT | Performed by: INTERNAL MEDICINE

## 2025-08-25 PROCEDURE — 83540 ASSAY OF IRON: CPT | Performed by: INTERNAL MEDICINE

## 2025-08-25 PROCEDURE — 84443 ASSAY THYROID STIM HORMONE: CPT | Performed by: INTERNAL MEDICINE

## 2025-08-25 PROCEDURE — 85027 COMPLETE CBC AUTOMATED: CPT | Performed by: INTERNAL MEDICINE

## 2025-08-25 PROCEDURE — 84439 ASSAY OF FREE THYROXINE: CPT | Performed by: INTERNAL MEDICINE

## 2025-08-25 PROCEDURE — 84403 ASSAY OF TOTAL TESTOSTERONE: CPT | Performed by: INTERNAL MEDICINE

## 2025-08-25 PROCEDURE — 84402 ASSAY OF FREE TESTOSTERONE: CPT | Performed by: INTERNAL MEDICINE

## 2025-08-25 PROCEDURE — 36415 COLL VENOUS BLD VENIPUNCTURE: CPT | Performed by: INTERNAL MEDICINE

## 2025-08-25 PROCEDURE — 99214 OFFICE O/P EST MOD 30 MIN: CPT | Performed by: INTERNAL MEDICINE

## 2025-08-25 PROCEDURE — 82607 VITAMIN B-12: CPT | Performed by: INTERNAL MEDICINE

## 2025-08-25 PROCEDURE — 80053 COMPREHEN METABOLIC PANEL: CPT | Performed by: INTERNAL MEDICINE

## 2025-08-25 PROCEDURE — 84466 ASSAY OF TRANSFERRIN: CPT | Performed by: INTERNAL MEDICINE

## 2025-08-25 PROCEDURE — 84144 ASSAY OF PROGESTERONE: CPT | Performed by: INTERNAL MEDICINE

## 2025-08-26 ENCOUNTER — RESULTS FOLLOW-UP (OUTPATIENT)
Dept: ENDOCRINOLOGY | Facility: CLINIC | Age: 55
End: 2025-08-26
Payer: COMMERCIAL

## 2025-08-26 DIAGNOSIS — E06.3 HYPOTHYROIDISM DUE TO HASHIMOTO'S THYROIDITIS: Primary | ICD-10-CM

## 2025-08-26 LAB
25(OH)D3 SERPL-MCNC: 59.1 NG/ML (ref 30–100)
ALBUMIN SERPL-MCNC: 4 G/DL (ref 3.5–5.2)
ALBUMIN/GLOB SERPL: 1.5 G/DL
ALP SERPL-CCNC: 47 U/L (ref 39–117)
ALT SERPL W P-5'-P-CCNC: 12 U/L (ref 1–33)
ANION GAP SERPL CALCULATED.3IONS-SCNC: 11.9 MMOL/L (ref 5–15)
AST SERPL-CCNC: 15 U/L (ref 1–32)
BILIRUB SERPL-MCNC: 0.9 MG/DL (ref 0–1.2)
BUN SERPL-MCNC: 7 MG/DL (ref 6–20)
BUN/CREAT SERPL: 12.5 (ref 7–25)
CALCIUM SPEC-SCNC: 9.7 MG/DL (ref 8.6–10.5)
CHLORIDE SERPL-SCNC: 105 MMOL/L (ref 98–107)
CO2 SERPL-SCNC: 24.1 MMOL/L (ref 22–29)
CREAT SERPL-MCNC: 0.56 MG/DL (ref 0.57–1)
DEPRECATED RDW RBC AUTO: 48.9 FL (ref 37–54)
EGFRCR SERPLBLD CKD-EPI 2021: 107.9 ML/MIN/1.73
ERYTHROCYTE [DISTWIDTH] IN BLOOD BY AUTOMATED COUNT: 13.4 % (ref 12.3–15.4)
ESTRADIOL SERPL HS-MCNC: 8.6 PG/ML
FERRITIN SERPL-MCNC: 73.5 NG/ML (ref 13–150)
GLOBULIN UR ELPH-MCNC: 2.7 GM/DL
GLUCOSE SERPL-MCNC: 84 MG/DL (ref 65–99)
HCT VFR BLD AUTO: 42.8 % (ref 34–46.6)
HGB BLD-MCNC: 14 G/DL (ref 12–15.9)
IRON 24H UR-MRATE: 106 MCG/DL (ref 37–145)
IRON SATN MFR SERPL: 27 % (ref 20–50)
MCH RBC QN AUTO: 32 PG (ref 26.6–33)
MCHC RBC AUTO-ENTMCNC: 32.7 G/DL (ref 31.5–35.7)
MCV RBC AUTO: 97.9 FL (ref 79–97)
PLATELET # BLD AUTO: 258 10*3/MM3 (ref 140–450)
PMV BLD AUTO: 10.1 FL (ref 6–12)
POTASSIUM SERPL-SCNC: 4.4 MMOL/L (ref 3.5–5.2)
PROGEST SERPL-MCNC: 0.4 NG/ML
PROT SERPL-MCNC: 6.7 G/DL (ref 6–8.5)
RBC # BLD AUTO: 4.37 10*6/MM3 (ref 3.77–5.28)
SODIUM SERPL-SCNC: 141 MMOL/L (ref 136–145)
T4 FREE SERPL-MCNC: 1.18 NG/DL (ref 0.92–1.68)
TIBC SERPL-MCNC: 386 MCG/DL (ref 298–536)
TRANSFERRIN SERPL-MCNC: 259 MG/DL (ref 200–360)
TSH SERPL DL<=0.05 MIU/L-ACNC: 3.67 UIU/ML (ref 0.27–4.2)
VIT B12 BLD-MCNC: 317 PG/ML (ref 211–946)
WBC NRBC COR # BLD AUTO: 5.94 10*3/MM3 (ref 3.4–10.8)

## 2025-08-26 RX ORDER — LEVOTHYROXINE SODIUM 100 MCG
100 TABLET ORAL DAILY
Qty: 30 TABLET | Refills: 3 | Status: SHIPPED | OUTPATIENT
Start: 2025-08-26 | End: 2026-08-26

## 2025-08-31 LAB
TESTOST FREE SERPL-MCNC: 1.1 PG/ML (ref 0–4.2)
TESTOST SERPL-MCNC: 30.7 NG/DL